# Patient Record
Sex: MALE | Race: BLACK OR AFRICAN AMERICAN | Employment: UNEMPLOYED | URBAN - METROPOLITAN AREA
[De-identification: names, ages, dates, MRNs, and addresses within clinical notes are randomized per-mention and may not be internally consistent; named-entity substitution may affect disease eponyms.]

---

## 2019-10-14 ENCOUNTER — APPOINTMENT (OUTPATIENT)
Dept: GENERAL RADIOLOGY | Age: 29
End: 2019-10-14
Payer: COMMERCIAL

## 2019-10-14 ENCOUNTER — HOSPITAL ENCOUNTER (EMERGENCY)
Age: 29
Discharge: HOME | End: 2019-10-14
Attending: EMERGENCY MEDICINE
Payer: COMMERCIAL

## 2019-10-14 VITALS
HEIGHT: 74 IN | RESPIRATION RATE: 12 BRPM | DIASTOLIC BLOOD PRESSURE: 67 MMHG | BODY MASS INDEX: 29.39 KG/M2 | SYSTOLIC BLOOD PRESSURE: 131 MMHG | OXYGEN SATURATION: 99 % | HEART RATE: 87 BPM | TEMPERATURE: 98.8 F | WEIGHT: 229 LBS

## 2019-10-14 DIAGNOSIS — S61.216A LACERATION OF RIGHT LITTLE FINGER WITHOUT FOREIGN BODY WITHOUT DAMAGE TO NAIL, INITIAL ENCOUNTER: Primary | ICD-10-CM

## 2019-10-14 PROCEDURE — 99283 EMERGENCY DEPT VISIT LOW MDM: CPT

## 2019-10-14 PROCEDURE — 2709999900 HC NON-CHARGEABLE SUPPLY

## 2019-10-14 PROCEDURE — 4500000023 HC ED LEVEL 3 PROCEDURE

## 2019-10-14 PROCEDURE — 73130 X-RAY EXAM OF HAND: CPT

## 2019-10-14 PROCEDURE — 6370000000 HC RX 637 (ALT 250 FOR IP): Performed by: EMERGENCY MEDICINE

## 2019-10-14 RX ORDER — ACETAMINOPHEN 500 MG
1000 TABLET ORAL ONCE
Status: COMPLETED | OUTPATIENT
Start: 2019-10-14 | End: 2019-10-14

## 2019-10-14 RX ADMIN — ACETAMINOPHEN 1000 MG: 500 TABLET ORAL at 20:02

## 2019-10-14 ASSESSMENT — PAIN SCALES - GENERAL
PAINLEVEL_OUTOF10: 4
PAINLEVEL_OUTOF10: 3

## 2019-10-14 ASSESSMENT — PAIN DESCRIPTION - PAIN TYPE: TYPE: ACUTE PAIN

## 2019-10-14 ASSESSMENT — PAIN DESCRIPTION - ORIENTATION: ORIENTATION: RIGHT

## 2019-10-14 ASSESSMENT — PAIN DESCRIPTION - FREQUENCY: FREQUENCY: INTERMITTENT

## 2019-10-14 ASSESSMENT — PAIN DESCRIPTION - DESCRIPTORS: DESCRIPTORS: ACHING;THROBBING

## 2022-10-19 ENCOUNTER — OFFICE VISIT (OUTPATIENT)
Dept: INTERNAL MEDICINE CLINIC | Age: 32
End: 2022-10-19
Payer: COMMERCIAL

## 2022-10-19 VITALS
HEART RATE: 78 BPM | RESPIRATION RATE: 16 BRPM | SYSTOLIC BLOOD PRESSURE: 128 MMHG | HEIGHT: 74 IN | WEIGHT: 234.2 LBS | BODY MASS INDEX: 30.06 KG/M2 | OXYGEN SATURATION: 99 % | TEMPERATURE: 97.6 F | DIASTOLIC BLOOD PRESSURE: 68 MMHG

## 2022-10-19 DIAGNOSIS — Z13.228 SCREENING FOR ENDOCRINE, METABOLIC AND IMMUNITY DISORDER: ICD-10-CM

## 2022-10-19 DIAGNOSIS — Z13.220 SCREENING CHOLESTEROL LEVEL: ICD-10-CM

## 2022-10-19 DIAGNOSIS — Z13.0 SCREENING FOR ENDOCRINE, METABOLIC AND IMMUNITY DISORDER: ICD-10-CM

## 2022-10-19 DIAGNOSIS — Z13.29 SCREENING FOR ENDOCRINE, METABOLIC AND IMMUNITY DISORDER: ICD-10-CM

## 2022-10-19 DIAGNOSIS — Z11.59 NEED FOR HEPATITIS C SCREENING TEST: ICD-10-CM

## 2022-10-19 DIAGNOSIS — F84.0 AUTISM: Primary | ICD-10-CM

## 2022-10-19 DIAGNOSIS — Z91.013 SHELLFISH ALLERGY: ICD-10-CM

## 2022-10-19 PROCEDURE — 90715 TDAP VACCINE 7 YRS/> IM: CPT | Performed by: INTERNAL MEDICINE

## 2022-10-19 PROCEDURE — 99204 OFFICE O/P NEW MOD 45 MIN: CPT | Performed by: INTERNAL MEDICINE

## 2022-10-19 PROCEDURE — 90674 CCIIV4 VAC NO PRSV 0.5 ML IM: CPT | Performed by: INTERNAL MEDICINE

## 2022-10-19 PROCEDURE — 90471 IMMUNIZATION ADMIN: CPT | Performed by: INTERNAL MEDICINE

## 2022-10-19 PROCEDURE — G0008 ADMIN INFLUENZA VIRUS VAC: HCPCS | Performed by: INTERNAL MEDICINE

## 2022-10-19 RX ORDER — EPINEPHRINE 0.3 MG/.3ML
0.3 INJECTION SUBCUTANEOUS ONCE
Qty: 2 EACH | Refills: 0 | Status: SHIPPED | OUTPATIENT
Start: 2022-10-19 | End: 2022-10-19

## 2022-10-19 RX ORDER — LORATADINE 10 MG/1
10 TABLET ORAL DAILY
COMMUNITY
End: 2022-10-19 | Stop reason: SDUPTHER

## 2022-10-19 RX ORDER — DIVALPROEX SODIUM 125 MG/1
125 TABLET, DELAYED RELEASE ORAL NIGHTLY
Qty: 90 TABLET | Refills: 3 | Status: SHIPPED | OUTPATIENT
Start: 2022-10-19

## 2022-10-19 RX ORDER — LORATADINE 10 MG/1
10 TABLET ORAL DAILY
Qty: 90 TABLET | Refills: 3 | Status: SHIPPED | OUTPATIENT
Start: 2022-10-19

## 2022-10-19 SDOH — ECONOMIC STABILITY: FOOD INSECURITY: WITHIN THE PAST 12 MONTHS, THE FOOD YOU BOUGHT JUST DIDN'T LAST AND YOU DIDN'T HAVE MONEY TO GET MORE.: NEVER TRUE

## 2022-10-19 SDOH — ECONOMIC STABILITY: FOOD INSECURITY: WITHIN THE PAST 12 MONTHS, YOU WORRIED THAT YOUR FOOD WOULD RUN OUT BEFORE YOU GOT MONEY TO BUY MORE.: NEVER TRUE

## 2022-10-19 ASSESSMENT — ENCOUNTER SYMPTOMS
CHOKING: 0
EYE REDNESS: 0
FACIAL SWELLING: 0
ABDOMINAL PAIN: 0
ABDOMINAL DISTENTION: 0
SHORTNESS OF BREATH: 0
SINUS PAIN: 0
RHINORRHEA: 0
PHOTOPHOBIA: 0

## 2022-10-19 ASSESSMENT — PATIENT HEALTH QUESTIONNAIRE - PHQ9
SUM OF ALL RESPONSES TO PHQ QUESTIONS 1-9: 0
2. FEELING DOWN, DEPRESSED OR HOPELESS: 0
SUM OF ALL RESPONSES TO PHQ QUESTIONS 1-9: 0
SUM OF ALL RESPONSES TO PHQ9 QUESTIONS 1 & 2: 0
1. LITTLE INTEREST OR PLEASURE IN DOING THINGS: 0

## 2022-10-19 ASSESSMENT — SOCIAL DETERMINANTS OF HEALTH (SDOH): HOW HARD IS IT FOR YOU TO PAY FOR THE VERY BASICS LIKE FOOD, HOUSING, MEDICAL CARE, AND HEATING?: NOT HARD AT ALL

## 2022-10-19 NOTE — PATIENT INSTRUCTIONS
Dr. Lagos Tenafly  [facility], [address]  Phone: [phone]    Dr. Bulmaro Dillard  [place]  [place], [address]  [phone]

## 2022-10-19 NOTE — PROGRESS NOTES
Julian Hurley (:  1990) is a 29 y.o. male,Established patient, here for evaluation of the following chief complaint(s):  Establish Care (New pt to establish) and Sinus Problem (History of seasonal allergies )         ASSESSMENT/PLAN:  1. Autism  Stable  -  currently on disability  -  continue the valproic acid    2. Shellfish allergy  -  provide patient with an epipen    3. Screening cholesterol level  -     Comprehensive Metabolic Panel; Future  -     Lipid Panel; Future    4. Need for hepatitis C screening test  -     Hepatitis C Antibody; Future    5. Screening for endocrine, metabolic and immunity disorder  -     VARICELLA ZOSTER ANTIBODY, IGG; Future      Return in about 5 months (around 3/19/2023) for Annual Physical.         Subjective   SUBJECTIVE/OBJECTIVE:  HPI patient comes in to establish care. He has a history of autism. Currently he is not working as he is on disability. He does stay with a mom who is his guardian. Mom states that things were to happen to her one of her other children would become his guardian. Does have a history of sleep disorder for which she is taking valproic acid. He does have a shellfish allergy and does need a other EpiPen. Is due for couple of vaccines. He does not want to have any blood work drawn right now. Review of Systems   Constitutional:  Negative for diaphoresis and fatigue. HENT:  Negative for ear pain, facial swelling, rhinorrhea and sinus pain. Eyes:  Negative for photophobia and redness. Respiratory:  Negative for choking and shortness of breath. Cardiovascular:  Negative for chest pain and leg swelling. Gastrointestinal:  Negative for abdominal distention and abdominal pain. Endocrine: Negative for heat intolerance and polydipsia. Genitourinary:  Negative for hematuria and penile pain.       Past Medical History:   Diagnosis Date    ADHD (attention deficit hyperactivity disorder)     Mental retardation      Past Surgical History:   Procedure Laterality Date    KNEE SURGERY       No family history on file. Social History     Socioeconomic History    Marital status: Single     Spouse name: Not on file    Number of children: Not on file    Years of education: Not on file    Highest education level: Not on file   Occupational History    Not on file   Tobacco Use    Smoking status: Never    Smokeless tobacco: Never   Substance and Sexual Activity    Alcohol use: Not on file    Drug use: No    Sexual activity: Not on file   Other Topics Concern    Not on file   Social History Narrative    Not on file     Social Determinants of Health     Financial Resource Strain: Low Risk     Difficulty of Paying Living Expenses: Not hard at all   Food Insecurity: No Food Insecurity    Worried About Running Out of Food in the Last Year: Never true    Ran Out of Food in the Last Year: Never true   Transportation Needs: Not on file   Physical Activity: Not on file   Stress: Not on file   Social Connections: Not on file   Intimate Partner Violence: Not on file   Housing Stability: Not on file        Objective   Vitals:    10/19/22 1003   BP: 128/68   Pulse: 78   Resp: 16   Temp: 97.6 °F (36.4 °C)   TempSrc: Temporal   SpO2: 99%   Weight: 234 lb 3.2 oz (106.2 kg)   Height: 6' 2\" (1.88 m)      Wt Readings from Last 3 Encounters:   10/19/22 234 lb 3.2 oz (106.2 kg)   10/14/19 229 lb (103.9 kg)   08/03/16 209 lb (94.8 kg)     BP Readings from Last 3 Encounters:   10/19/22 128/68   10/14/19 131/67   08/03/16 125/71     Body mass index is 30.07 kg/m². Facility age limit for growth percentiles is 20 years. Physical Exam  Constitutional:       General: He is not in acute distress. Appearance: Normal appearance. He is not ill-appearing. HENT:      Right Ear: Tympanic membrane normal.      Left Ear: Tympanic membrane normal.      Nose: Nose normal. No congestion.       Mouth/Throat:      Mouth: Mucous membranes are moist.      Pharynx: No oropharyngeal exudate or posterior oropharyngeal erythema. Eyes:      General:         Right eye: No discharge. Left eye: No discharge. Pupils: Pupils are equal, round, and reactive to light. Cardiovascular:      Rate and Rhythm: Normal rate and regular rhythm. Pulmonary:      Effort: Pulmonary effort is normal. No respiratory distress. Breath sounds: No stridor. No wheezing. Musculoskeletal:      Cervical back: Normal range of motion and neck supple. No rigidity or tenderness. Neurological:      Mental Status: He is alert. Psychiatric:         Mood and Affect: Affect is flat. Affect is not labile. Speech: Speech is delayed. Behavior: Behavior normal. Behavior is not agitated, slowed, aggressive or withdrawn. Cognition and Memory: Cognition is impaired. An electronic signature was used to authenticate this note.     --Elis Herring MD

## 2022-10-20 NOTE — TELEPHONE ENCOUNTER
----- Message from [address] sent at 10/20/2022 12:50 PM EDT -----  Subject: Refill Request    QUESTIONS  Name of Medication? mineral oil-hydrophilic petrolatum (AQUAPHOR) ointment  Patient-reported dosage and instructions? as needed  How many days do you have left? 0  Preferred Pharmacy? [id number] phone number (if available)? [phone]  Additional Information for Provider? All the medications that were   prescribed yesterday need to go to [facility] in [place]. [facility] said that the information they have is not correct so they could   not fill his order. For any questions or concerns, please call. Thank you  ---------------------------------------------------------------------------  --------------,  Name of Medication? loratadine (CLARITIN) 10 MG tablet  Patient-reported dosage and instructions? 1 X daily  How many days do you have left? 0  Preferred Pharmacy? [facility] phone number (if available)? [phone]  Additional Information for Provider? All the medications that were   prescribed yesterday need to go to [facility] in [place]. [facility] said that the information they have is not correct so they could   not fill his order. For any questions or concerns, please call. Thank you  ---------------------------------------------------------------------------  --------------,  Name of Medication? divalproex (DEPAKOTE) 125 MG DR tablet  Patient-reported dosage and instructions? 1 X daily  How many days do you have left? 0  Preferred Pharmacy? [facility] phone number (if available)? [phone]  Additional Information for Provider? All the medications that were   prescribed yesterday need to go to [facility] in [place]. [facility] said that the information they have is not correct so they could   not fill his order. For any questions or concerns, please call.  Thank you  ---------------------------------------------------------------------------  --------------,  Name of Medication? EPINEPHrine (EPIPEN 2-DENIS) 0.3 MG/0.3ML SOAJ injection  Patient-reported dosage and instructions? as needed  How many days do you have left? 0  Preferred Pharmacy? [facility] phone number (if available)? [phone]  Additional Information for Provider? All the medications that were   prescribed yesterday need to go to [facility] in [address]. [facility] said that the information they have is not correct so they could   not fill his order. For any questions or concerns, please call. Thank you  ---------------------------------------------------------------------------  --------------  CALL BACK INFO  What is the best way for the office to contact you? OK to leave message on   voicemail  Preferred Call Back Phone Number? [phone]  ---------------------------------------------------------------------------  --------------  SCRIPT ANSWERS  Relationship to Patient? Parent  Representative Name? Keenan Cannon  Patient is under 25 and the Parent has custody? No  Is the Representative on the appropriate HIPAA document in Epic?  Yes

## 2022-10-21 RX ORDER — PETROLATUM 42 G/100G
OINTMENT TOPICAL
Qty: 228 G | Refills: 2 | Status: SHIPPED | OUTPATIENT
Start: 2022-10-21

## 2022-11-07 NOTE — TELEPHONE ENCOUNTER
Please Advise        Medication pend in chart was sent to wrong pharmacy need to be sent to [facility] pharmacy change in chart

## 2022-11-08 RX ORDER — EPINEPHRINE 0.3 MG/.3ML
0.3 INJECTION SUBCUTANEOUS ONCE
Qty: 2 EACH | Refills: 0 | Status: SHIPPED | OUTPATIENT
Start: 2022-11-08 | End: 2022-11-08

## 2022-11-08 RX ORDER — LORATADINE 10 MG/1
10 TABLET ORAL DAILY
Qty: 90 TABLET | Refills: 3 | Status: SHIPPED | OUTPATIENT
Start: 2022-11-08

## 2022-11-08 RX ORDER — DIVALPROEX SODIUM 125 MG/1
125 TABLET, DELAYED RELEASE ORAL NIGHTLY
Qty: 90 TABLET | Refills: 3 | Status: SHIPPED | OUTPATIENT
Start: 2022-11-08

## 2022-11-08 RX ORDER — PETROLATUM 42 G/100G
OINTMENT TOPICAL
Qty: 228 G | Refills: 2 | Status: SHIPPED | OUTPATIENT
Start: 2022-11-08

## 2022-11-17 ENCOUNTER — HOSPITAL ENCOUNTER (EMERGENCY)
Age: 32
Discharge: HOME | End: 2022-11-17
Payer: COMMERCIAL

## 2022-11-17 VITALS
DIASTOLIC BLOOD PRESSURE: 92 MMHG | WEIGHT: 235 LBS | HEIGHT: 74 IN | TEMPERATURE: 98.3 F | OXYGEN SATURATION: 99 % | BODY MASS INDEX: 30.16 KG/M2 | RESPIRATION RATE: 17 BRPM | SYSTOLIC BLOOD PRESSURE: 142 MMHG | HEART RATE: 80 BPM

## 2022-11-17 DIAGNOSIS — N48.9 LESION OF PENIS: Primary | ICD-10-CM

## 2022-11-17 PROCEDURE — 99283 EMERGENCY DEPT VISIT LOW MDM: CPT

## 2022-11-17 PROCEDURE — 0064U ANTB TP TOTAL&RPR IA QUAL: CPT

## 2022-11-17 PROCEDURE — 87491 CHLMYD TRACH DNA AMP PROBE: CPT

## 2022-11-17 PROCEDURE — 87591 N.GONORRHOEAE DNA AMP PROB: CPT

## 2022-11-17 RX ORDER — DOXYCYCLINE 100 MG/1
100 TABLET ORAL 2 TIMES DAILY
Qty: 28 TABLET | Refills: 0 | Status: SHIPPED | OUTPATIENT
Start: 2022-11-17 | End: 2022-12-01

## 2022-11-17 ASSESSMENT — PAIN DESCRIPTION - PAIN TYPE: TYPE: ACUTE PAIN

## 2022-11-17 ASSESSMENT — PAIN DESCRIPTION - LOCATION: LOCATION: PENIS

## 2022-11-17 ASSESSMENT — PAIN SCALES - GENERAL: PAINLEVEL_OUTOF10: 7

## 2022-11-17 ASSESSMENT — LIFESTYLE VARIABLES: HOW MANY STANDARD DRINKS CONTAINING ALCOHOL DO YOU HAVE ON A TYPICAL DAY: PATIENT DOES NOT DRINK

## 2022-11-17 NOTE — ED PROVIDER NOTES
[address] [facility] ENCOUNTER          PHYSICIAN ASSISTANT NOTE       Date of evaluation: 11/17/2022    Chief Complaint     Other (Irritated penis)      History of Present Illness     Asim Still is a 29 y.o. male with past medical history significant for ADHD and developmental delay, who presents with concern for lesion on his penis. Patient states that there is an open area on the shaft of his penis that has been there for approximately 4 days. States that it is not necessarily painful, but he is concerned because it has not gone away. States that he has had similar symptoms in the past, but they have always gone away. States that he was sexually active just prior to this, was initially wearing a condom but then removed the condom even though the female told him not to. States that he is not sure if it was just too dry and got irritated or what. Did not notice the sore directly after, but did notice that the next day. States that every time he showers he feels like the scab peels back off and it is open again. Has drained some clear liquid but no purulence. Denies any surrounding warmth or erythema. Denies any fevers, chills, abdominal pain, nausea, vomiting, dysuria, penile discharge. Denies any testicular pain or scrotal swelling. States he has not been sexually active or masturbated since. States this was not a new female partner. Review of Systems     Review of Systems  See HPI, all others negative. Past Medical, Surgical, Family, and Social History     He has a past medical history of ADHD (attention deficit hyperactivity disorder) and Mental retardation. He has a past surgical history that includes knee surgery. His family history is not on file. He reports that he has never smoked. He has never used smokeless tobacco. He reports that he does not drink alcohol and does not use drugs.     Medications     Discharge Medication List as of 11/17/2022  1:07 PM CONTINUE these medications which have NOT CHANGED    Details   !! mineral oil-hydrophilic petrolatum (HYDROPHOR) ointment Apply topically as needed. , Disp-228 g, R-2, Normal      EPINEPHrine (EPIPEN 2-DENIS) 0.3 MG/0.3ML SOAJ injection Inject 0.3 mLs into the muscle once for 1 dose Use as directed for allergic reaction, Disp-2 each, R-0Normal      divalproex (DEPAKOTE) 125 MG DR tablet Take 1 tablet by mouth nightly, Disp-90 tablet, R-3Normal      loratadine (CLARITIN) 10 MG tablet Take 1 tablet by mouth daily, Disp-90 tablet, R-3Normal      !! mineral oil-hydrophilic petrolatum (AQUAPHOR) ointment Apply topically twice daily as needed, Disp-396 g, R-5, Normal       !! - Potential duplicate medications found. Please discuss with provider. Allergies     He is allergic to penicillins and shellfish-derived products. Physical Exam     INITIAL VITALS: BP: (!) 142/92, Temp: 98.3 °F (36.8 °C), Heart Rate: 80, Resp: 17, SpO2: 99 %  Physical Exam  Constitutional:       General: He is not in acute distress. Appearance: Normal appearance. He is not ill-appearing, toxic-appearing or diaphoretic. HENT:      Head: Normocephalic and atraumatic. Mouth/Throat:      Pharynx: Oropharynx is clear. Eyes:      Conjunctiva/sclera: Conjunctivae normal.   Cardiovascular:      Rate and Rhythm: Normal rate. Pulmonary:      Effort: Pulmonary effort is normal. No respiratory distress. Abdominal:      General: There is no distension. Palpations: Abdomen is soft. Tenderness: There is no abdominal tenderness. There is no guarding or rebound. Genitourinary:     Testes: Normal.      Comments: Chancre like appearing lesion noted to left distal shaft of penis. No urethral discharge. No tenderness to penile shaft. No tenderness to testicles or scrotal swelling, warmth, erythema. No inguinal lymphadenopathy. Musculoskeletal:      Cervical back: Normal range of motion.    Neurological:      Mental Status: He is alert. Diagnostic Results     RADIOLOGY:  No orders to display       LABS:   Results for orders placed or performed during the hospital encounter of 11/17/22   Syphilis Antibody Cascading Reflex   Result Value Ref Range    Total Syphillis IgG/IgM REACTIVE (A) Non-reactive       RECENT VITALS:  BP: (!) 142/92, Temp: 98.3 °F (36.8 °C), Heart Rate: 80, Resp: 17, SpO2: 99 %     Procedures       ED Course     Nursing Notes, Past Medical Hx,Past Surgical Hx, Social Hx, Allergies, and Family Hx were reviewed. The patient was given the following medications:  Orders Placed This Encounter   Medications    doxycycline monohydrate (ADOXA) 100 MG tablet     Sig: Take 1 tablet by mouth 2 times daily for 14 days     Dispense:  28 tablet     Refill:  0       CONSULTS:  None    MEDICAL DECISION MAKING / ASSESSMENT / Billychet Andrews is a 29 y.o. male presenting with concern regarding lesion on his penis. Patient's symptoms are most consistent with syphilis. Syphilis blood draw was sent and pending at this time. Due to concern for syphilis, urine gonorrhea and Chlamydia tests sent as well. Given high suspicion for syphilis, will treat for such. Patient has a reported penicillin allergy, so spoke with pharmacy regarding appropriate second line therapy. Recommended doxycycline twice daily x14 days. This prescription was sent to patient's pharmacy. Discussed importance of safe sex practices and avoiding any sexual activity until lesion is healed and antibiotics are completed. Discussed that patient will be called if any of the other tests are positive. Patient agreeable and discharged in stable condition, ambulatory without difficulty. No signs of epididymitis or orchitis on exam.    This patient was evaluated by myself independently    Clinical Impression     1. Lesion of penis        Disposition     PATIENT REFERRED TO:  No follow-up provider specified.     DISCHARGE MEDICATIONS:  Discharge Medication List as of 11/17/2022  1:07 PM        START taking these medications    Details   doxycycline monohydrate (ADOXA) 100 MG tablet Take 1 tablet by mouth 2 times daily for 14 days, Disp-28 tablet, R-0Print             DISPOSITION Decision To Discharge 11/17/2022 12:35:06 PM        Byrce Snow PA-C  11/18/22 2817

## 2022-11-17 NOTE — DISCHARGE INSTRUCTIONS
Take doxycycline twice daily for 14 days. Do NOT have sex or masturbate until finished with antibiotic and lesion heals. Clean gently with soap and water daily. Wear boxer briefs to help with extra support. Sent blood work for syphilis and urine gonorrhea/chlamydia studies and you will be called if positive. Return if experience any surrounding warmth, discolored drainage, or pain.

## 2022-11-17 NOTE — ED NOTES
Patient discharged to home via family. Written discharge instructions reviewed with understanding. Copy of AVS and signed prescription sent home with patient. Patient able to walk from ED without assistance.         Paula Keene RN  11/17/22 8986
none

## 2022-11-18 LAB
C. TRACHOMATIS DNA ,URINE: NEGATIVE
N. GONORRHOEAE DNA, URINE: NEGATIVE
RPR CONFIRMATORY: REACTIVE
RPR TITER: NORMAL
TOTAL SYPHILLIS IGG/IGM: REACTIVE

## 2022-11-21 ENCOUNTER — TELEPHONE (OUTPATIENT)
Dept: INTERNAL MEDICINE CLINIC | Age: 32
End: 2022-11-21

## 2022-11-21 NOTE — TELEPHONE ENCOUNTER
Please Advise        Pt went to ER on 11/17 they gave him a antibiotic Pt said that the skin is scabbing and then it come off when showering would like to know if its something that he can do to help it heel faster

## 2023-07-18 NOTE — TELEPHONE ENCOUNTER
Recent Visits  Date Type Provider Dept   10/19/22 Office Visit Pratibha Vazquez MD [facility]   Showing recent visits within past 540 days with a meds authorizing provider and meeting all other requirements  Future Appointments  No visits were found meeting these conditions.   Showing future appointments within next 150 days with a meds authorizing provider and meeting all other requirements     10/19/2022

## 2023-07-19 RX ORDER — DIVALPROEX SODIUM 125 MG/1
TABLET, DELAYED RELEASE ORAL
Qty: 30 TABLET | Refills: 0 | Status: SHIPPED | OUTPATIENT
Start: 2023-07-19

## 2023-07-19 RX ORDER — LORATADINE 10 MG/1
10 TABLET ORAL DAILY
Qty: 30 TABLET | Refills: 0 | Status: SHIPPED | OUTPATIENT
Start: 2023-07-19

## 2023-09-07 RX ORDER — LORATADINE 10 MG/1
10 TABLET ORAL DAILY
Qty: 30 TABLET | Refills: 0 | Status: SHIPPED | OUTPATIENT
Start: 2023-09-07

## 2023-11-20 ENCOUNTER — OFFICE VISIT (OUTPATIENT)
Dept: INTERNAL MEDICINE CLINIC | Age: 33
End: 2023-11-20
Payer: COMMERCIAL

## 2023-11-20 VITALS
HEART RATE: 57 BPM | BODY MASS INDEX: 28.76 KG/M2 | DIASTOLIC BLOOD PRESSURE: 62 MMHG | WEIGHT: 224 LBS | OXYGEN SATURATION: 98 % | SYSTOLIC BLOOD PRESSURE: 118 MMHG

## 2023-11-20 DIAGNOSIS — Z91.09 ENVIRONMENTAL ALLERGIES: ICD-10-CM

## 2023-11-20 DIAGNOSIS — Z23 NEED FOR INFLUENZA VACCINATION: ICD-10-CM

## 2023-11-20 DIAGNOSIS — Z00.00 WELL ADULT EXAM: Primary | ICD-10-CM

## 2023-11-20 DIAGNOSIS — Z11.4 ENCOUNTER FOR SCREENING FOR HIV: ICD-10-CM

## 2023-11-20 DIAGNOSIS — Z11.59 NEED FOR HEPATITIS C SCREENING TEST: ICD-10-CM

## 2023-11-20 DIAGNOSIS — Z91.013 SHELLFISH ALLERGY: ICD-10-CM

## 2023-11-20 DIAGNOSIS — Z11.4 SCREENING FOR HIV WITHOUT PRESENCE OF RISK FACTORS: ICD-10-CM

## 2023-11-20 PROCEDURE — G0008 ADMIN INFLUENZA VIRUS VAC: HCPCS | Performed by: STUDENT IN AN ORGANIZED HEALTH CARE EDUCATION/TRAINING PROGRAM

## 2023-11-20 PROCEDURE — 99395 PREV VISIT EST AGE 18-39: CPT | Performed by: STUDENT IN AN ORGANIZED HEALTH CARE EDUCATION/TRAINING PROGRAM

## 2023-11-20 PROCEDURE — 90674 CCIIV4 VAC NO PRSV 0.5 ML IM: CPT | Performed by: STUDENT IN AN ORGANIZED HEALTH CARE EDUCATION/TRAINING PROGRAM

## 2023-11-20 RX ORDER — DIVALPROEX SODIUM 125 MG/1
TABLET, DELAYED RELEASE ORAL
Qty: 30 TABLET | Refills: 0 | OUTPATIENT
Start: 2023-11-20

## 2023-11-20 RX ORDER — EPINEPHRINE 0.3 MG/.3ML
0.3 INJECTION SUBCUTANEOUS ONCE
Qty: 1 EACH | Refills: 0 | Status: SHIPPED | OUTPATIENT
Start: 2023-11-20 | End: 2023-11-20

## 2023-11-20 RX ORDER — LORATADINE 10 MG/1
10 TABLET ORAL DAILY
Qty: 30 TABLET | Refills: 0 | Status: SHIPPED | OUTPATIENT
Start: 2023-11-20

## 2023-11-20 RX ORDER — LORATADINE 10 MG/1
10 TABLET ORAL DAILY
Qty: 30 TABLET | Refills: 0 | OUTPATIENT
Start: 2023-11-20

## 2023-11-20 RX ORDER — PETROLATUM 42 G/100G
OINTMENT TOPICAL
Qty: 228 G | Refills: 2 | Status: SHIPPED | OUTPATIENT
Start: 2023-11-20

## 2023-11-20 RX ORDER — DIVALPROEX SODIUM 125 MG/1
125 TABLET, DELAYED RELEASE ORAL NIGHTLY
Qty: 30 TABLET | Refills: 0 | Status: SHIPPED | OUTPATIENT
Start: 2023-11-20

## 2023-11-20 SDOH — ECONOMIC STABILITY: HOUSING INSECURITY
IN THE LAST 12 MONTHS, WAS THERE A TIME WHEN YOU DID NOT HAVE A STEADY PLACE TO SLEEP OR SLEPT IN A SHELTER (INCLUDING NOW)?: NO

## 2023-11-20 SDOH — ECONOMIC STABILITY: INCOME INSECURITY: HOW HARD IS IT FOR YOU TO PAY FOR THE VERY BASICS LIKE FOOD, HOUSING, MEDICAL CARE, AND HEATING?: NOT HARD AT ALL

## 2023-11-20 SDOH — ECONOMIC STABILITY: FOOD INSECURITY: WITHIN THE PAST 12 MONTHS, YOU WORRIED THAT YOUR FOOD WOULD RUN OUT BEFORE YOU GOT MONEY TO BUY MORE.: NEVER TRUE

## 2023-11-20 SDOH — ECONOMIC STABILITY: FOOD INSECURITY: WITHIN THE PAST 12 MONTHS, THE FOOD YOU BOUGHT JUST DIDN'T LAST AND YOU DIDN'T HAVE MONEY TO GET MORE.: NEVER TRUE

## 2023-11-20 ASSESSMENT — PATIENT HEALTH QUESTIONNAIRE - PHQ9
8. MOVING OR SPEAKING SO SLOWLY THAT OTHER PEOPLE COULD HAVE NOTICED. OR THE OPPOSITE, BEING SO FIGETY OR RESTLESS THAT YOU HAVE BEEN MOVING AROUND A LOT MORE THAN USUAL: 3
3. TROUBLE FALLING OR STAYING ASLEEP: 3
9. THOUGHTS THAT YOU WOULD BE BETTER OFF DEAD, OR OF HURTING YOURSELF: 0
SUM OF ALL RESPONSES TO PHQ QUESTIONS 1-9: 15
SUM OF ALL RESPONSES TO PHQ QUESTIONS 1-9: 15
SUM OF ALL RESPONSES TO PHQ9 QUESTIONS 1 & 2: 3
4. FEELING TIRED OR HAVING LITTLE ENERGY: 1
SUM OF ALL RESPONSES TO PHQ QUESTIONS 1-9: 15
6. FEELING BAD ABOUT YOURSELF - OR THAT YOU ARE A FAILURE OR HAVE LET YOURSELF OR YOUR FAMILY DOWN: 1
SUM OF ALL RESPONSES TO PHQ QUESTIONS 1-9: 15
5. POOR APPETITE OR OVEREATING: 2
2. FEELING DOWN, DEPRESSED OR HOPELESS: 1
1. LITTLE INTEREST OR PLEASURE IN DOING THINGS: 2
7. TROUBLE CONCENTRATING ON THINGS, SUCH AS READING THE NEWSPAPER OR WATCHING TELEVISION: 2
10. IF YOU CHECKED OFF ANY PROBLEMS, HOW DIFFICULT HAVE THESE PROBLEMS MADE IT FOR YOU TO DO YOUR WORK, TAKE CARE OF THINGS AT HOME, OR GET ALONG WITH OTHER PEOPLE: 1

## 2023-11-20 ASSESSMENT — ANXIETY QUESTIONNAIRES
IF YOU CHECKED OFF ANY PROBLEMS ON THIS QUESTIONNAIRE, HOW DIFFICULT HAVE THESE PROBLEMS MADE IT FOR YOU TO DO YOUR WORK, TAKE CARE OF THINGS AT HOME, OR GET ALONG WITH OTHER PEOPLE: SOMEWHAT DIFFICULT
6. BECOMING EASILY ANNOYED OR IRRITABLE: 2
4. TROUBLE RELAXING: 3
2. NOT BEING ABLE TO STOP OR CONTROL WORRYING: 2
GAD7 TOTAL SCORE: 16
7. FEELING AFRAID AS IF SOMETHING AWFUL MIGHT HAPPEN: 2
1. FEELING NERVOUS, ANXIOUS, OR ON EDGE: 2
3. WORRYING TOO MUCH ABOUT DIFFERENT THINGS: 2
5. BEING SO RESTLESS THAT IT IS HARD TO SIT STILL: 3

## 2023-11-20 ASSESSMENT — ENCOUNTER SYMPTOMS
SHORTNESS OF BREATH: 0
BLOOD IN STOOL: 0
COUGH: 0
ABDOMINAL PAIN: 0

## 2023-11-20 NOTE — PROGRESS NOTES
2023    Niels Figueroa (:  1990) is a 34 y.o. male, here for a preventive medicine evaluation. Assessment & Plan   Well adult exam   - Discussed age appropriate preventive care including healthy diet, daily exercise, immunizations and age & gender guided screening test  - Mediterranean lifestyle discussed and encouraged; handout given  -     Hemoglobin A1C; Future  -     Lipid, Fasting; Future  -     Comprehensive Metabolic Panel; Future  -     TSH with Reflex to FT4; Future  -     CBC with Auto Differential; Future  Shellfish allergy  - Refill  -     EPINEPHrine (EPIPEN 2-DENIS) 0.3 MG/0.3ML SOAJ injection; Inject 0.3 mLs into the muscle once for 1 dose Use as directed for allergic reaction, Disp-1 each, R-0Normal  Need for influenza vaccination  -     Influenza, FLUCELVAX, (age 10 mo+), IM, PF, 0.5 mL  Encounter for screening for HIV  -     HIV Screen; Future  Need for hepatitis C screening test  -     Hepatitis C Antibody; Future  Environmental allergies  -     loratadine (CLARITIN) 10 MG tablet; Take 1 tablet by mouth daily, Disp-30 tablet, R-0Normal  Screening for HIV without presence of risk factors  -     HIV Screen; Future    Return in about 1 year (around 2024) for Annual.       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 Respiratory:  Negative for cough and shortness of breath. Cardiovascular:  Negative for chest pain. Gastrointestinal:  Negative for abdominal pain and blood in stool.    Genitourinary:  Negative for dysuria and

## 2023-12-14 DIAGNOSIS — Z91.013 SHELLFISH ALLERGY: ICD-10-CM

## 2023-12-14 DIAGNOSIS — Z91.09 ENVIRONMENTAL ALLERGIES: ICD-10-CM

## 2023-12-14 RX ORDER — DIVALPROEX SODIUM 125 MG/1
125 TABLET, DELAYED RELEASE ORAL NIGHTLY
Qty: 30 TABLET | Refills: 1 | Status: SHIPPED | OUTPATIENT
Start: 2023-12-14

## 2023-12-14 RX ORDER — LORATADINE 10 MG/1
10 TABLET ORAL DAILY
Qty: 30 TABLET | Refills: 5 | Status: SHIPPED | OUTPATIENT
Start: 2023-12-14

## 2023-12-14 RX ORDER — EPINEPHRINE 0.3 MG/.3ML
INJECTION SUBCUTANEOUS
Qty: 2 EACH | Refills: 1 | Status: SHIPPED | OUTPATIENT
Start: 2023-12-14

## 2023-12-14 RX ORDER — LORATADINE 10 MG/1
10 TABLET ORAL DAILY
Qty: 90 TABLET | Refills: 2 | OUTPATIENT
Start: 2023-12-14

## 2023-12-14 NOTE — TELEPHONE ENCOUNTER
Recent Visits  Date Type Provider Dept   11/20/23 Office Visit Baldemar Cook DO [facility]   10/19/22 Office Visit Ariel Tam MD [facility]   Showing recent visits within past 540 days with a meds authorizing provider and meeting all other requirements  Future Appointments  No visits were found meeting these conditions.   Showing future appointments within next 150 days with a meds authorizing provider and meeting all other requirements     11/20/2023

## 2024-02-01 DIAGNOSIS — Z91.013 SHELLFISH ALLERGY: ICD-10-CM

## 2024-02-01 NOTE — TELEPHONE ENCOUNTER
Recent Visits  Date Type Provider Dept   11/20/23 Office Visit Carol Reyes DO [facility]   10/19/22 Office Visit Rigo Bolivar MD [facility]   Showing recent visits within past 540 days with a meds authorizing provider and meeting all other requirements  Future Appointments  No visits were found meeting these conditions.  Showing future appointments within next 150 days with a meds authorizing provider and meeting all other requirements     11/20/2023

## 2024-02-02 RX ORDER — DIVALPROEX SODIUM 125 MG/1
125 TABLET, DELAYED RELEASE ORAL NIGHTLY
Qty: 30 TABLET | Refills: 1 | Status: SHIPPED | OUTPATIENT
Start: 2024-02-02

## 2024-02-02 RX ORDER — EPINEPHRINE 0.3 MG/.3ML
INJECTION SUBCUTANEOUS
Qty: 2 EACH | Refills: 1 | Status: SHIPPED | OUTPATIENT
Start: 2024-02-02

## 2024-03-18 ENCOUNTER — TELEPHONE (OUTPATIENT)
Dept: INTERNAL MEDICINE CLINIC | Age: 34
End: 2024-03-18

## 2024-03-18 NOTE — TELEPHONE ENCOUNTER
Advised patients mom is last physical was 11/2023, he will not need another one until November of this year.  Mom will call back to RS.

## 2024-03-18 NOTE — TELEPHONE ENCOUNTER
----- Message from Linette Perez sent at 3/18/2024 11:00 AM EDT -----  Subject: Appointment Request    Reason for Call: Established Patient Appointment needed: Routine Physical   Exam    QUESTIONS    Reason for appointment request? No appointments available during search     Additional Information for Provider? Patient's mother  called to   have another evaluation for Social Security the last one was performed   11/20/2023 - Please call mother Isabella Farfan for scheduling options   [phone] and would need before 4/9/2024.  ---------------------------------------------------------------------------  --------------  CALL BACK INFO  [phone]; OK to leave message on voicemail  ---------------------------------------------------------------------------  --------------  SCRIPT ANSWERS

## 2024-03-22 DIAGNOSIS — Z91.013 SHELLFISH ALLERGY: ICD-10-CM

## 2024-03-22 RX ORDER — DIVALPROEX SODIUM 125 MG/1
125 TABLET, DELAYED RELEASE ORAL NIGHTLY
Qty: 30 TABLET | Refills: 1 | Status: SHIPPED | OUTPATIENT
Start: 2024-03-22

## 2024-03-22 RX ORDER — EPINEPHRINE 0.3 MG/.3ML
INJECTION SUBCUTANEOUS
Qty: 2 EACH | Refills: 1 | Status: SHIPPED | OUTPATIENT
Start: 2024-03-22